# Patient Record
(demographics unavailable — no encounter records)

---

## 2025-07-29 NOTE — PHYSICAL EXAM
[Rotation to left] : rotation to left [Left] : left shoulder [NL (0-180)] : full active forward flexion 0-180 degrees [] : no pain with external rotation

## 2025-07-29 NOTE — DISCUSSION/SUMMARY
[de-identified] : The patient's condition is acute on chronic Confounding medical conditions/concerns: none Tests/Studies Independently Interpreted Today: X-rays of c-spine were obtained and reviewed on 7/29/25 2 views were obtained including AP and lateral. I independently reviewed the films and the clinically relevant findings include: C4-7 disc space narrowing, face arthropathy X-rays of L shoulder were obtained and reviewed on 7/29/25:  3 views were obtained including AP, Y-view, and Axillary. I  independently reviewed the films and the clinically relevant findings include: shoulder calcification at GH joint.  ========================================================================== Due to worsening pain and radicular symptoms recommend the patient obtain MRI of cervical spine to rule out neural compression and/or herniated discs. Follow up after MRI to possibly rule out surgical pathology and discuss future treatment options including KATELIN's.   It is necessary for the patient to continue physical therapy in order to return to previous level of activity prior to injury, prevent reinjury, and to prevent surgical intervention. Recommend 203 x/week for 4-6 weeks. Pt is attending PT at [facility], considering switching to [facility].   Prescribed patient MDP. Use as directed for the next five days. Following which OTC NSAIDs may be used PRN for pain,  inflammation, and discomfort. No NSAID medications should be taken concurrently with the Medrol Dose Pack.  Follow up after MRI.   JOSE C

## 2025-07-29 NOTE — HISTORY OF PRESENT ILLNESS
[de-identified] :  Jul 29, 2025: Patient is Seymour Diaz Jr a 61 y/o who is here for LT shoulder and neck pain. Patient states it has gradually onset over time for the past few months, no specific injury. Patient c/o consistent dull and aching pain in LT shoulder that radiates into the neck. Confirms n/t. No limited ROM but increased pain when laying on it during sleep and activity. OTC medication does not help. Pain level: 7. Sx: LT RTC repair 10/08/2020.

## 2025-07-29 NOTE — HISTORY OF PRESENT ILLNESS
[de-identified] :  Jul 29, 2025: Patient is Seymour Diaz Jr a 61 y/o who is here for LT shoulder and neck pain. Patient states it has gradually onset over time for the past few months, no specific injury. Patient c/o consistent dull and aching pain in LT shoulder that radiates into the neck. Confirms n/t. No limited ROM but increased pain when laying on it during sleep and activity. OTC medication does not help. Pain level: 7. Sx: LT RTC repair 10/08/2020.

## 2025-07-29 NOTE — DISCUSSION/SUMMARY
[de-identified] : The patient's condition is acute on chronic Confounding medical conditions/concerns: none Tests/Studies Independently Interpreted Today: X-rays of c-spine were obtained and reviewed on 7/29/25 2 views were obtained including AP and lateral. I independently reviewed the films and the clinically relevant findings include: C4-7 disc space narrowing, face arthropathy X-rays of L shoulder were obtained and reviewed on 7/29/25:  3 views were obtained including AP, Y-view, and Axillary. I  independently reviewed the films and the clinically relevant findings include: shoulder calcification at GH joint.  ========================================================================== Due to worsening pain and radicular symptoms recommend the patient obtain MRI of cervical spine to rule out neural compression and/or herniated discs. Follow up after MRI to possibly rule out surgical pathology and discuss future treatment options including KATELIN's.   It is necessary for the patient to continue physical therapy in order to return to previous level of activity prior to injury, prevent reinjury, and to prevent surgical intervention. Recommend 203 x/week for 4-6 weeks. Pt is attending PT at [facility], considering switching to [facility].   Prescribed patient MDP. Use as directed for the next five days. Following which OTC NSAIDs may be used PRN for pain,  inflammation, and discomfort. No NSAID medications should be taken concurrently with the Medrol Dose Pack.  Follow up after MRI.   JOSE C